# Patient Record
Sex: FEMALE | Race: WHITE
[De-identification: names, ages, dates, MRNs, and addresses within clinical notes are randomized per-mention and may not be internally consistent; named-entity substitution may affect disease eponyms.]

---

## 2017-07-15 ENCOUNTER — HOSPITAL ENCOUNTER (EMERGENCY)
Dept: HOSPITAL 47 - EC | Age: 37
Discharge: HOME | End: 2017-07-15
Payer: COMMERCIAL

## 2017-07-15 VITALS — TEMPERATURE: 98.7 F | RESPIRATION RATE: 18 BRPM

## 2017-07-15 VITALS — SYSTOLIC BLOOD PRESSURE: 132 MMHG | HEART RATE: 78 BPM | DIASTOLIC BLOOD PRESSURE: 68 MMHG

## 2017-07-15 DIAGNOSIS — S51.811A: Primary | ICD-10-CM
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
## 2017-07-15 DIAGNOSIS — Z79.899: ICD-10-CM
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
## 2017-07-15 DIAGNOSIS — Z88.2: ICD-10-CM

## 2017-07-15 PROCEDURE — 12001 RPR S/N/AX/GEN/TRNK 2.5CM/<: CPT

## 2017-07-15 PROCEDURE — 99284 EMERGENCY DEPT VISIT MOD MDM: CPT
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
## 2022-05-06 ENCOUNTER — HOSPITAL ENCOUNTER (EMERGENCY)
Dept: HOSPITAL 47 - EC | Age: 42
Discharge: HOME | End: 2022-05-06
Payer: COMMERCIAL

## 2022-05-06 VITALS — TEMPERATURE: 98 F | RESPIRATION RATE: 18 BRPM

## 2022-05-06 VITALS — SYSTOLIC BLOOD PRESSURE: 152 MMHG | DIASTOLIC BLOOD PRESSURE: 87 MMHG | HEART RATE: 88 BPM

## 2022-05-06 DIAGNOSIS — F43.21: Primary | ICD-10-CM

## 2022-05-06 DIAGNOSIS — Z88.8: ICD-10-CM

## 2022-05-06 DIAGNOSIS — Z88.2: ICD-10-CM

## 2022-05-06 DIAGNOSIS — F41.9: ICD-10-CM

## 2022-05-06 PROCEDURE — 82075 ASSAY OF BREATH ETHANOL: CPT

## 2022-05-06 PROCEDURE — 87086 URINE CULTURE/COLONY COUNT: CPT

## 2022-05-06 PROCEDURE — 99284 EMERGENCY DEPT VISIT MOD MDM: CPT

## 2022-05-06 PROCEDURE — 80306 DRUG TEST PRSMV INSTRMNT: CPT

## 2022-05-06 NOTE — ED
General Adult HPI





- General


Chief complaint: Psychiatric Symptoms


Stated complaint: Mental Health


Time Seen by Provider: 05/06/22 20:15


Source: patient, RN notes reviewed


Mode of arrival: ambulatory


Limitations: no limitations





- History of Present Illness


Initial comments: 





42-year-old female with a past medical history of anxiety and depression 

presents to the emergency department for evaluation of mental health concerns.  

Patient states she has had worsening anxiety since the passing of her mother on 

April 8.  Patient's spouse states the patient has been expressing paranoid 

thoughts and is more emotionally labile.  Patient states she is not taking any 

mental health medications.  Denies thoughts or attempts to cause harm to herself

or anyone else.  No fever, chills, cough, congestion, chest pain, shortness of 

breath, abdominal pain, nausea, vomiting, diarrhea, or dysuria.





- Related Data


                                Home Medications











 Medication  Instructions  Recorded  Confirmed


 


Melatonin 10 mg PO HS 05/06/22 05/06/22











                                    Allergies











Allergy/AdvReac Type Severity Reaction Status Date / Time


 


sulfamethoxazole Allergy  Unknown Verified 05/06/22 20:32





[From Bactrim]     


 


trimethoprim [From Bactrim] Allergy  Unknown Verified 05/06/22 20:32














Review of Systems


ROS Statement: 


Those systems with pertinent positive or pertinent negative responses have been 

documented in the HPI.





ROS Other: All systems not noted in ROS Statement are negative.





Past Medical History


Past Medical History: No Reported History


History of Any Multi-Drug Resistant Organisms: None Reported


Past Surgical History: Appendectomy


Additional Past Surgical History / Comment(s): ear surgery (multiple)


Past Psychological History: Anxiety, Depression


Smoking Status: Never smoker


Past Alcohol Use History: None Reported


Past Drug Use History: Marijuana





General Exam


Limitations: no limitations


General appearance: alert, in no apparent distress, other (Well-developed, well-

nourished female in no acute distress.  Initial temperature 98.0, pulse 72, 

respirations 18, blood pressure 144/90, pulse ox 98% on room air.)


Head exam: Present: atraumatic, normocephalic, normal inspection


Eye exam: Present: normal appearance.  Absent: scleral icterus, conjunctival 

injection


ENT exam: Present: normal exam, normal oropharynx, mucous membranes moist


Respiratory exam: Present: normal lung sounds bilaterally.  Absent: respiratory 

distress, wheezes, rales, rhonchi, stridor


Cardiovascular Exam: Present: regular rate, normal rhythm, normal heart sounds. 

Absent: systolic murmur, diastolic murmur, rubs, gallop, clicks


GI/Abdominal exam: Present: soft, normal bowel sounds.  Absent: distended, 

tenderness, guarding, rebound, rigid


Neurological exam: Present: alert, oriented X3, CN II-XII intact


  ** Expanded


Patient oriented to: Present: person, place, time


Speech: Present: fluid speech


Eye Response: (4) open spontaneously


Motor Response: (6) obeys commands


Verbal Response: (5) oriented


[place] Total: 15


Psychiatric exam: Present: flat affect


Skin exam: Present: warm, dry, intact, normal color





Course


                                   Vital Signs











  05/06/22 05/06/22 05/06/22





  14:32 20:43 22:56


 


Temperature 98.0 F  


 


Pulse Rate 72 62 88


 


Respiratory 18 18 18





Rate   


 


Blood Pressure 144/90 155/96 152/87


 


O2 Sat by Pulse 98 98 99





Oximetry   














- Reevaluation(s)


Reevaluation #1: 





05/06/22 21:43


Spoke with CORI Beyer-EPS, who is going to provide patient with additional 

resources for grief counseling in addition to her scheduled appointment with [facility]

this week.  Patient will be discharged home to follow up on an outpatient basis.








Medical Decision Making





- Medical Decision Making





42-year-old female with a past medical history of anxiety and depression 

presents to the emergency department for evaluation of worsening anxiety and 

paranoid thoughts in a period of grief.  Upon exam, patient is well-appearing 

and in no acute distress. She is well-kempt; her  is present at bedside. 

Patient explains that her mother passed away on April 8 and she has been having 

difficulty since.  Her spouse states she is increasingly paranoid and patient 

acknowledges this to be true.  Patient denies any thoughts of causing harm to 

herself or anyone else.  She was evaluated by EPS after medical clearance.  She 

was provided with additional resources and is scheduled to see [facility] this week.  

Encouraged to follow up with her PCP as needed.  Return parameters discussed in 

detail.  Patient verbalizes understanding and agrees with this plan.  

Attending:Cielo.





- Lab Data


                                   Lab Results











  05/06/22 Range/Units





  20:54 


 


Urine Opiates Screen  Not Detected  (NotDetected)  


 


Ur Oxycodone Screen  Not Detected  (NotDetected)  


 


Urine Methadone Screen  Not Detected  (NotDetected)  


 


Ur Propoxyphene Screen  Not Detected  (NotDetected)  


 


Ur Barbiturates Screen  Not Detected  (NotDetected)  


 


U Tricyclic Antidepress  Not Detected  (NotDetected)  


 


Ur Phencyclidine Scrn  Not Detected  (NotDetected)  


 


Ur Amphetamines Screen  Not Detected  (NotDetected)  


 


U Methamphetamines Scrn  Not Detected  (NotDetected)  


 


U Benzodiazepines Scrn  Not Detected  (NotDetected)  


 


Urine Cocaine Screen  Not Detected  (NotDetected)  


 


U Marijuana (THC) Screen  Not Detected  (NotDetected)  














Disposition


Clinical Impression: 


 Grief, Anxiety





Disposition: HOME SELF-CARE


Condition: Stable


Instructions (If sedation given, give patient instructions):  Grief and Loss 

(ED), Anxiety (ED)


Additional Instructions: 


Keep your appointment with [facility] as scheduled.


Consider additional resources as provided.


Maintain close contact with supportive family/friends.


Return to the emergency department with any additional concerns, including 

thoughts of causing harm to herself or anyone else.


Is patient prescribed a controlled substance at d/c from ED?: No


Referrals: 


Jeremiah Coelho MD [Primary Care Provider] - 1-2 days


Time of Disposition: 22:36